# Patient Record
Sex: FEMALE | Race: WHITE | NOT HISPANIC OR LATINO | Employment: FULL TIME | URBAN - METROPOLITAN AREA
[De-identification: names, ages, dates, MRNs, and addresses within clinical notes are randomized per-mention and may not be internally consistent; named-entity substitution may affect disease eponyms.]

---

## 2023-05-31 ENCOUNTER — OFFICE VISIT (OUTPATIENT)
Dept: OBGYN CLINIC | Facility: CLINIC | Age: 23
End: 2023-05-31

## 2023-05-31 VITALS
HEIGHT: 58 IN | DIASTOLIC BLOOD PRESSURE: 88 MMHG | WEIGHT: 140 LBS | SYSTOLIC BLOOD PRESSURE: 126 MMHG | HEART RATE: 108 BPM | BODY MASS INDEX: 29.39 KG/M2

## 2023-05-31 DIAGNOSIS — S93.491A SPRAIN OF ANTERIOR TALOFIBULAR LIGAMENT OF RIGHT ANKLE, INITIAL ENCOUNTER: ICD-10-CM

## 2023-05-31 DIAGNOSIS — S93.601A SPRAIN OF RIGHT FOOT, INITIAL ENCOUNTER: Primary | ICD-10-CM

## 2023-05-31 DIAGNOSIS — S90.31XA CONTUSION OF RIGHT FOOT, INITIAL ENCOUNTER: ICD-10-CM

## 2023-05-31 RX ORDER — NORETHINDRONE ACETATE/ETHINYL ESTRADIOL AND FERROUS FUMARATE 1.5-30(21)
1 KIT ORAL DAILY
COMMUNITY
Start: 2023-03-02

## 2023-05-31 NOTE — LETTER
May 31, 2023     Patient: Luci Van  YOB: 2000  Date of Visit: 2023      To Whom it May Concern:    April Leamon Apgar is under my professional care  April was seen in my office on 2023  April may work with restrictions: Wear CAM boot at all times  April will be re-evaluated in 2 weeks  If you have any questions or concerns, please don't hesitate to call           Sincerely,          Jermaine Mckeon DO        CC: No Recipients

## 2023-05-31 NOTE — PROGRESS NOTES
Assessment/Plan:  Assessment/Plan   Diagnoses and all orders for this visit:    Sprain of right foot, initial encounter  -     Cam Boot  -     Diclofenac Sodium (VOLTAREN) 1 %; Apply 2 g topically 3 (three) times a day    Sprain of anterior talofibular ligament of right ankle, initial encounter  -     Cam Boot  -     Diclofenac Sodium (VOLTAREN) 1 %; Apply 2 g topically 3 (three) times a day    Contusion of right foot, initial encounter    Other orders  -     Corina Fe 1 5/30 1 5-30 MG-MCG tablet; Take 1 tablet by mouth daily        29-year-old female with onset of right ankle and foot pain from injury at work on 5/16/2023  Discussed with patient physical exam, imaging studies, impression, and plan  X-rays of the right ankle and foot resulted for being unremarkable for osseous abnormality  Physical exam right ankle noted for mild tenderness ATFL and anterior ankle  She has tenderness dorsum of the foot over the first metatarsal and fourth and fifth metatarsals  She has limited range of motion with dorsiflexion  She has intact strength of the ankle  Passive external rotation and syndesmosis squeeze are unremarkable  She is intact neurovascularly  Clinical impression is that she sustained sprain and contusion to the ankle and foot  I discussed treatment regimen of stabilization, topical anti-inflammatory, supplements, and home exercise  I provided her cam boot and she may work able to bear as tolerated  She is to apply topical diclofenac gel 3 times a day for the next 10 days  She is to take turmeric at least 1000 mg daily and tart cherry at least 1000 mg daily  I provided printed instructions of home exercises to do on regular basis  She will follow-up in 2 weeks at which point she will be reevaluated  29-year-old female presents for evaluation of right ankle and foot pain onset from injury at work on 5/16/2023    She had stepped off a curb and her foot/ankle got stuck in a crack on the pavement and twisted as she fell  She had pain described as sudden in onset, localized to the anterior medial aspect of the foot, radiating distally to the forefoot and to the dorsal lateral aspect of the foot, achy and throbbing, worse with bearing weight and ambulating, and improved with resting  She had difficulty bearing weight due to symptoms  She is also treating with icing and taking ibuprofen  She presented to urgent care where x-ray evaluation was unremarkable for acute osseous abnormality  She was provided Aircast, given crutches, and advised to follow-up with orthopedic care  At home she has intermittently attempted weightbearing without crutches and reports discomfort with doing so  Subjective:   Patient ID: Malia Holbrook is a 24 y o  female  Chief Complaint   Patient presents with   • Right Ankle - Pain       27-year-old female presents for evaluation of right ankle and foot pain onset from injury at work on 5/16/2023  She had stepped off a curb and her foot/ankle got stuck in a crack on the pavement and twisted as she fell  She had pain described as sudden in onset, localized to the anterior medial aspect of the foot, radiating distally to the forefoot and to the dorsal lateral aspect of the foot, achy and throbbing, worse with bearing weight and ambulating, and improved with resting  She had difficulty bearing weight due to symptoms  She started treating with icing and taking ibuprofen  She started noticing bruising  She presented to urgent care where x-ray evaluation was unremarkable for acute osseous abnormality  She was provided Aircast, given crutches, and advised to follow-up with orthopedic care  At home she has intermittently attempted weightbearing without crutches and reports discomfort with doing so  Ankle Pain  This is a new problem  The current episode started 1 to 4 weeks ago  The problem occurs daily  The problem has been gradually improving   Associated symptoms include "arthralgias and joint swelling  Pertinent negatives include no abdominal pain, chest pain, chills, fever, numbness, rash, sore throat or weakness  The symptoms are aggravated by standing, walking and twisting  She has tried rest, NSAIDs and ice for the symptoms  The treatment provided mild relief  The following portions of the patient's history were reviewed and updated as appropriate: She  has no past medical history on file  She has No Known Allergies       Review of Systems   Constitutional: Negative for chills and fever  HENT: Negative for sore throat  Eyes: Negative for visual disturbance  Respiratory: Negative for shortness of breath  Cardiovascular: Negative for chest pain  Gastrointestinal: Negative for abdominal pain  Genitourinary: Negative for flank pain  Musculoskeletal: Positive for arthralgias and joint swelling  Skin: Negative for rash and wound  Neurological: Negative for weakness and numbness  Hematological: Does not bruise/bleed easily  Psychiatric/Behavioral: Negative for self-injury  Objective:  Vitals:    05/31/23 1528   BP: 126/88   Pulse: (!) 108   Weight: 63 5 kg (140 lb)   Height: 4' 10\" (1 473 m)     Right Ankle Exam     Muscle Strength   Dorsiflexion:  5/5  Plantar flexion:  5/5    Other   Sensation: normal  Pulse: present           Observations     Right Ankle/Foot   Negative for deformity  Tenderness     Right Ankle/Foot   Tenderness in the anterior ankle, anterior talofibular ligament, dorsum foot (first, 4th, and 5th metatarsals) and medial malleolus  No tenderness in the Achilles insertion, calcaneofibular ligament, lateral malleolus, navicular, peroneal tendon, posterior tibial tendon, posterior talofibular ligament, proximal Achilles and talar dome       Active Range of Motion     Right Ankle/Foot   Dorsiflexion (kf): 5 degrees with pain  Plantar flexion: WFL and with pain  Inversion: WFL and with pain  Eversion: WFL and with " pain    Strength/Myotome Testing     Right Ankle/Foot   Dorsiflexion: 5  Plantar flexion: 5  Inversion: 5  Eversion: 5    Tests     Right Ankle/Foot   Positive for metatarsal squeeze  Negative for anterior drawer, calcaneal squeeze, posterior drawer, syndesmosis squeeze and syndesmosis external rotation  Physical Exam  Vitals and nursing note reviewed  Constitutional:       General: She is not in acute distress  Appearance: She is well-developed  She is not ill-appearing or diaphoretic  HENT:      Head: Normocephalic and atraumatic  Right Ear: External ear normal       Left Ear: External ear normal    Eyes:      Conjunctiva/sclera: Conjunctivae normal    Neck:      Trachea: No tracheal deviation  Cardiovascular:      Comments: Tachycardic  Pulmonary:      Effort: Pulmonary effort is normal  No respiratory distress  Abdominal:      General: There is no distension  Musculoskeletal:         General: Tenderness present  No swelling, deformity or signs of injury  Right ankle: Tenderness present over the medial malleolus and ATF ligament  No lateral malleolus, CF ligament or posterior TF ligament tenderness  Anterior drawer test negative  Right foot: No deformity  Skin:     General: Skin is warm and dry  Coloration: Skin is not jaundiced or pale  Neurological:      Mental Status: She is alert and oriented to person, place, and time  Psychiatric:         Mood and Affect: Mood normal          Behavior: Behavior normal          Thought Content:  Thought content normal          Judgment: Judgment normal

## 2023-05-31 NOTE — PATIENT INSTRUCTIONS
Ankle Exercises   AMBULATORY CARE:   What you need to know about ankle exercises: Ankle exercises help strengthen your ankle and improve its function after injury  These are beginning exercises  Ask your healthcare provider if you need to see a physical therapist for more advanced exercises  General guidelines for ankle exercises:   Do these exercises 3 to 5 days a week, or as directed by your healthcare provider  Ask if you should do the exercises on each ankle  Do the exercises in the order that your healthcare provider recommends  This will help prevent swelling, chronic pain, and reinjury  Start with range of motion exercises  Then move to strengthening exercises, and finally to balancing exercises  Warm up before you do ankle exercises  Walk or ride a stationary bike for 5 to 10 minutes to prepare your ankle for movement  Stop if you feel pain  It is normal to feel some discomfort at first but you should not feel pain  Tell your doctor or physical therapist if you have pain while you exercise  Regular exercise will help decrease your discomfort over time  How to perform range of motion exercises safely:  Begin with range of motion exercises to improve flexibility  Ask your healthcare provider when you can progress to strengthening exercises  Ankle alphabet:  Sit on a chair so that your feet do not touch the floor  Use your big toe to write each letter of the alphabet  Use only your foot and ankle, and keep your movements small  Do 2 sets  Calf stretches:      Sitting calf stretches with a towel:  Sit on the floor with both legs out straight in front of you  Loop a towel around the ball of your injured foot  Grasp the ends of the towel and pull it toward you  Keep your leg and back straight  Do not lean forward as you pull the towel  Hold for 30 seconds  Then relax for 30 seconds  Do 2 sets of 10           Standing calf stretches:  Stand facing a wall with the foot that is not injured forward and your knee slightly bent  Keep the leg with the injured foot straight and behind you with your toes pointed in slightly  With both heels flat on the floor, press your hips forward  Do not arch your back  Hold for 30 seconds, and then relax for 30 seconds  Do 2 sets of 10  Repeat with your leg bent  Do 2 sets of 10  How to perform strengthening exercises safely:  After you can perform range of motion exercises without pain, you may begin strengthening exercises  Ask your healthcare provider when you can progress to balancing exercises  Ankle movement in 4 directions:  Sit on the floor with your legs straight in front of you  Keep your heels on the floor for support  Dorsiflexion:  Begin with your toes pointing straight up  Pull your toes toward your body  Slowly return to the starting position  Do 3 sets of 5  Plantar flexion:  Begin with your toes pointing straight up  Push your toes away from your body  Slowly return to the starting position  Do 3 sets of 5  Inversion:  Begin with your toes pointing straight up  Push your toes inward, toward each other  Slowly return to the starting position  Do 3 sets of 5  Eversion:  Begin with your toes pointing straight up  Push your toes outward, away from each other  Slowly return to the starting position  Do 3 sets of 5  Toe curls with a towel:  Sit on a chair so that both of your feet are flat on the floor  Place a small towel on the floor in front of your injured foot  Grab the center of the towel with your toes and curl the towel toward you  Relax and repeat  Do 1 set of 5  [place]aho pick-ups:  Sit on a chair so that both of your feet are flat on the floor  Place 20 marbles on the floor in front of your injured foot  Use your toes to  one marble at a time and place it into a bowl  Repeat until you have picked up all the marbles  Do 1 set       Heel raises:      Single leg heel raises:  Stand with your weight evenly on both feet  Hold on to a chair or a wall for balance  Lift the foot that is not injured off the floor so all your weight is placed on your injured foot  Raise the heel of your injured foot as high as you can  Slowly lower your heel to the floor  Do 1 set of 10  Double leg heel raises:  Stand with your weight evenly on both feet  Hold on to a chair or a wall for balance  Raise both of your heels as high as you can  Slowly lower your heels to the floor  Do 1 set of 10  Heel and toe walks:      Heel walks:  Begin in a standing position  Lift your toes off the floor and walk on your heels  Keep your toes lifted as high as possible  Do 2 sets of 10  Toe walks:  Begin in a standing position  Lift your heels off the floor and walk on the balls and toes of your feet  Keep your heels lifted as high as possible  Do 2 sets of 10  How to perform a balance exercise safely:  After you can perform strengthening exercises without pain, you may do this beginning balancing exercise  Ask your healthcare provider for more advanced balance exercises  Single leg stance:  Stand with your weight evenly on both feet, or hold on to a chair or a wall  Do not lean to the side  Lift the foot that is not injured off the floor so all your weight is placed on your injured foot  Balance on your injured foot  Ask your healthcare provider how long to hold this position  Call your doctor or physical therapist if:   You have new pain, or your pain becomes worse  You have questions or concerns about your condition, care, or exercise program     © Copyright Tasha Paulino 2022 Information is for End User's use only and may not be sold, redistributed or otherwise used for commercial purposes  The above information is an  only  It is not intended as medical advice for individual conditions or treatments   Talk to your doctor, nurse or pharmacist before following any medical regimen to see if it is safe and effective for you

## 2023-06-14 ENCOUNTER — OFFICE VISIT (OUTPATIENT)
Dept: OBGYN CLINIC | Facility: CLINIC | Age: 23
End: 2023-06-14
Payer: OTHER MISCELLANEOUS

## 2023-06-14 VITALS
HEIGHT: 58 IN | SYSTOLIC BLOOD PRESSURE: 125 MMHG | HEART RATE: 85 BPM | WEIGHT: 140 LBS | BODY MASS INDEX: 29.39 KG/M2 | DIASTOLIC BLOOD PRESSURE: 85 MMHG

## 2023-06-14 DIAGNOSIS — S93.491D SPRAIN OF ANTERIOR TALOFIBULAR LIGAMENT OF RIGHT ANKLE, SUBSEQUENT ENCOUNTER: ICD-10-CM

## 2023-06-14 DIAGNOSIS — S93.601D SPRAIN OF RIGHT FOOT, SUBSEQUENT ENCOUNTER: Primary | ICD-10-CM

## 2023-06-14 PROCEDURE — 99213 OFFICE O/P EST LOW 20 MIN: CPT | Performed by: FAMILY MEDICINE

## 2023-06-14 NOTE — PROGRESS NOTES
Assessment/Plan:  Assessment/Plan   Diagnoses and all orders for this visit:    Sprain of right foot, subsequent encounter    Sprain of anterior talofibular ligament of right ankle, subsequent encounter  -     Brace        26-year-old female with onset of right ankle and foot pain from injury at work on 5/16/2023  Discussed with patient physical exam, impression, and plan  Physical exam right ankle noted for mild tenderness to the anterior ankle and medial malleolus  There is mild tenderness dorsum of the foot over the first and second metatarsals  She has intact range of motion and strength of the foot and ankle  Passive external rotation and syndesmosis squeeze are unremarkable  Metatarsal squeeze is unremarkable  She demonstrates heel walk, toe walk, duck walk, single-leg hop without pain  Clinical impression is that she is improving well from her injury  She may discontinue cam boot and I will transition her lace up brace which she may wear during physical activity  She is to gradually return to full activity over the course of the next 1 week  She will follow-up with me as needed  Subjective:   Patient ID: Sesar Serrano is a 24 y o  female  Chief Complaint   Patient presents with   • Right Ankle - Follow-up       26-year-old female following up for onset of right ankle and foot pain from injury 5/16/2023  She was last seen by me 2 weeks ago at which point she was placed in cam boot and advised on topical diclofenac  She reports feel much better since her last visit  She reports having pain described localized mainly to the plantar aspect/medial arch of the foot, throbbing and burning, worse after prolonged standing and ambulation, and improved with resting  At home she has tried ambulating without the cam boot and denies significant discomfort doing so  Ankle Pain  This is a new problem  The current episode started 1 to 4 weeks ago  The problem occurs intermittently   The problem has "been gradually improving  Associated symptoms include arthralgias  Pertinent negatives include no joint swelling, numbness or weakness  The symptoms are aggravated by standing and walking  She has tried rest and immobilization for the symptoms  The treatment provided significant relief  Review of Systems   Musculoskeletal: Positive for arthralgias  Negative for joint swelling  Neurological: Negative for weakness and numbness  Objective:  Vitals:    06/14/23 1102   BP: 125/85   Pulse: 85   Weight: 63 5 kg (140 lb)   Height: 4' 10\" (1 473 m)     Right Ankle Exam     Muscle Strength   Dorsiflexion:  5/5  Plantar flexion:  5/5    Other   Sensation: normal  Pulse: present           Observations     Right Ankle/Foot   Negative for deformity  Tenderness     Right Ankle/Foot   Tenderness in the anterior ankle, dorsum foot (1st and 2nd metatarsal) and medial malleolus  No tenderness in the Achilles insertion, anterior talofibular ligament, fifth metatarsal base, calcaneofibular ligament, lateral malleolus, navicular, peroneal tendon, plantar fascia, posterior tibial tendon, posterior talofibular ligament, proximal Achilles and talar dome  Active Range of Motion     Right Ankle/Foot   Dorsiflexion (kf): WFL  Plantar flexion: WFL  Inversion: WFL  Eversion: WFL    Strength/Myotome Testing     Right Ankle/Foot   Dorsiflexion: 5  Plantar flexion: 5  Inversion: 5  Eversion: 5    Tests     Right Ankle/Foot   Negative for anterior drawer, calcaneal squeeze, metatarsal squeeze, posterior drawer, syndesmosis squeeze and syndesmosis external rotation  Physical Exam  Vitals and nursing note reviewed  Constitutional:       General: She is not in acute distress  Appearance: She is well-developed  She is not ill-appearing or diaphoretic  HENT:      Head: Normocephalic and atraumatic        Right Ear: External ear normal       Left Ear: External ear normal    Eyes:      Conjunctiva/sclera: Conjunctivae " normal    Neck:      Trachea: No tracheal deviation  Cardiovascular:      Rate and Rhythm: Normal rate  Pulmonary:      Effort: Pulmonary effort is normal  No respiratory distress  Abdominal:      General: There is no distension  Musculoskeletal:         General: Tenderness present  No swelling, deformity or signs of injury  Right ankle: Tenderness present over the medial malleolus  No lateral malleolus, ATF ligament, CF ligament, posterior TF ligament or base of 5th metatarsal tenderness  Anterior drawer test negative  Right foot: No deformity  Skin:     General: Skin is warm and dry  Coloration: Skin is not jaundiced or pale  Neurological:      Mental Status: She is alert and oriented to person, place, and time  Psychiatric:         Mood and Affect: Mood normal          Behavior: Behavior normal          Thought Content:  Thought content normal          Judgment: Judgment normal

## 2023-06-14 NOTE — LETTER
June 14, 2023     Patient: Luci Van  YOB: 2000  Date of Visit: 6/14/2023      To Whom it May Concern:    Luci Gonsales is under my professional care  April was seen in my office on 6/14/2023  April may return to work full duty  Allow to wear ankle brace  If you have any questions or concerns, please don't hesitate to call           Sincerely,          Jermaine Mckeon DO        CC: No Recipients